# Patient Record
Sex: FEMALE | Race: WHITE | URBAN - METROPOLITAN AREA
[De-identification: names, ages, dates, MRNs, and addresses within clinical notes are randomized per-mention and may not be internally consistent; named-entity substitution may affect disease eponyms.]

---

## 2020-12-21 LAB
HEP B, EXTERNAL RESULT: NEGATIVE
HEPATITIS C ANTIBODY, EXTERNAL RESULT: NON REACTIVE
RPR, EXTERNAL RESULT: NON REACTIVE
RUBELLA TITER, EXTERNAL RESULT: NORMAL

## 2021-06-23 LAB — GBS, EXTERNAL RESULT: NEGATIVE

## 2021-07-27 ENCOUNTER — HOSPITAL ENCOUNTER (INPATIENT)
Age: 41
LOS: 2 days | Discharge: HOME | End: 2021-07-29
Attending: OBSTETRICS & GYNECOLOGY | Admitting: OBSTETRICS & GYNECOLOGY
Payer: COMMERCIAL

## 2021-07-27 LAB
ABO/RH: NORMAL
ABO/RH: NORMAL
AMPHETAMINE SCREEN, URINE: NORMAL
ANTIBODY SCREEN: NORMAL
BARBITURATE SCREEN URINE: NORMAL
BENZODIAZEPINE SCREEN, URINE: NORMAL
BUPRENORPHINE URINE: NORMAL
CANNABINOID SCREEN URINE: NORMAL
COCAINE METABOLITE SCREEN URINE: NORMAL
HCT VFR BLD CALC: 41.8 % (ref 36–48)
HEMOGLOBIN: 14.2 G/DL (ref 12–16)
Lab: NORMAL
MCH RBC QN AUTO: 29.6 PG (ref 26–34)
MCHC RBC AUTO-ENTMCNC: 33.9 G/DL (ref 31–36)
MCV RBC AUTO: 87.1 FL (ref 80–100)
METHADONE SCREEN, URINE: NORMAL
OPIATE SCREEN URINE: NORMAL
OXYCODONE URINE: NORMAL
PDW BLD-RTO: 14.3 % (ref 12.4–15.4)
PH UA: 7
PHENCYCLIDINE SCREEN URINE: NORMAL
PLATELET # BLD: 185 K/UL (ref 135–450)
PMV BLD AUTO: 10.4 FL (ref 5–10.5)
PROPOXYPHENE SCREEN: NORMAL
RBC # BLD: 4.79 M/UL (ref 4–5.2)
SARS-COV-2, NAAT: NOT DETECTED
WBC # BLD: 8.3 K/UL (ref 4–11)

## 2021-07-27 PROCEDURE — 86900 BLOOD TYPING SEROLOGIC ABO: CPT

## 2021-07-27 PROCEDURE — 85027 COMPLETE CBC AUTOMATED: CPT

## 2021-07-27 PROCEDURE — 80307 DRUG TEST PRSMV CHEM ANLYZR: CPT

## 2021-07-27 PROCEDURE — 86780 TREPONEMA PALLIDUM: CPT

## 2021-07-27 PROCEDURE — 36415 COLL VENOUS BLD VENIPUNCTURE: CPT

## 2021-07-27 PROCEDURE — 87635 SARS-COV-2 COVID-19 AMP PRB: CPT

## 2021-07-27 PROCEDURE — 86901 BLOOD TYPING SEROLOGIC RH(D): CPT

## 2021-07-27 PROCEDURE — 86850 RBC ANTIBODY SCREEN: CPT

## 2021-07-27 PROCEDURE — 1220000000 HC SEMI PRIVATE OB R&B

## 2021-07-27 PROCEDURE — 2580000003 HC RX 258: Performed by: OBSTETRICS & GYNECOLOGY

## 2021-07-27 PROCEDURE — 99211 OFF/OP EST MAY X REQ PHY/QHP: CPT

## 2021-07-27 RX ORDER — LIDOCAINE HYDROCHLORIDE 10 MG/ML
30 INJECTION, SOLUTION EPIDURAL; INFILTRATION; INTRACAUDAL; PERINEURAL PRN
Status: DISCONTINUED | OUTPATIENT
Start: 2021-07-27 | End: 2021-07-28

## 2021-07-27 RX ORDER — SODIUM CHLORIDE, SODIUM LACTATE, POTASSIUM CHLORIDE, CALCIUM CHLORIDE 600; 310; 30; 20 MG/100ML; MG/100ML; MG/100ML; MG/100ML
500 INJECTION, SOLUTION INTRAVENOUS PRN
Status: DISCONTINUED | OUTPATIENT
Start: 2021-07-27 | End: 2021-07-28

## 2021-07-27 RX ORDER — PNV NO.95/FERROUS FUM/FOLIC AC 28MG-0.8MG
TABLET ORAL
COMMUNITY

## 2021-07-27 RX ORDER — SODIUM CHLORIDE, SODIUM LACTATE, POTASSIUM CHLORIDE, CALCIUM CHLORIDE 600; 310; 30; 20 MG/100ML; MG/100ML; MG/100ML; MG/100ML
INJECTION, SOLUTION INTRAVENOUS CONTINUOUS
Status: DISCONTINUED | OUTPATIENT
Start: 2021-07-27 | End: 2021-07-28

## 2021-07-27 RX ORDER — TERBUTALINE SULFATE 1 MG/ML
0.25 INJECTION, SOLUTION SUBCUTANEOUS ONCE
Status: DISCONTINUED | OUTPATIENT
Start: 2021-07-27 | End: 2021-07-28

## 2021-07-27 RX ORDER — SODIUM CHLORIDE, SODIUM LACTATE, POTASSIUM CHLORIDE, CALCIUM CHLORIDE 600; 310; 30; 20 MG/100ML; MG/100ML; MG/100ML; MG/100ML
1000 INJECTION, SOLUTION INTRAVENOUS PRN
Status: DISCONTINUED | OUTPATIENT
Start: 2021-07-27 | End: 2021-07-28

## 2021-07-27 RX ORDER — ONDANSETRON 2 MG/ML
4 INJECTION INTRAMUSCULAR; INTRAVENOUS EVERY 6 HOURS PRN
Status: DISCONTINUED | OUTPATIENT
Start: 2021-07-27 | End: 2021-07-28

## 2021-07-27 RX ADMIN — SODIUM CHLORIDE, POTASSIUM CHLORIDE, SODIUM LACTATE AND CALCIUM CHLORIDE: 600; 310; 30; 20 INJECTION, SOLUTION INTRAVENOUS at 22:02

## 2021-07-27 ASSESSMENT — PAIN DESCRIPTION - DESCRIPTORS
DESCRIPTORS: CRAMPING

## 2021-07-28 ENCOUNTER — ANESTHESIA EVENT (OUTPATIENT)
Dept: LABOR AND DELIVERY | Age: 41
End: 2021-07-28
Payer: COMMERCIAL

## 2021-07-28 ENCOUNTER — ANESTHESIA (OUTPATIENT)
Dept: LABOR AND DELIVERY | Age: 41
End: 2021-07-28
Payer: COMMERCIAL

## 2021-07-28 PROBLEM — O34.219 PREVIOUS CESAREAN SECTION COMPLICATING PREGNANCY: Status: ACTIVE | Noted: 2021-07-28

## 2021-07-28 PROBLEM — Z34.90 SUPERVISION OF REPEAT TERM PREGNANCY: Status: ACTIVE | Noted: 2021-07-28

## 2021-07-28 PROBLEM — Z37.9 NORMAL LABOR: Status: ACTIVE | Noted: 2021-07-28

## 2021-07-28 PROBLEM — Z98.891 HISTORY OF VBAC: Status: ACTIVE | Noted: 2021-07-28

## 2021-07-28 LAB — TOTAL SYPHILLIS IGG/IGM: NORMAL

## 2021-07-28 PROCEDURE — 7200000001 HC VAGINAL DELIVERY

## 2021-07-28 PROCEDURE — 10907ZC DRAINAGE OF AMNIOTIC FLUID, THERAPEUTIC FROM PRODUCTS OF CONCEPTION, VIA NATURAL OR ARTIFICIAL OPENING: ICD-10-PCS | Performed by: OBSTETRICS & GYNECOLOGY

## 2021-07-28 PROCEDURE — 1200000000 HC SEMI PRIVATE

## 2021-07-28 PROCEDURE — 2500000003 HC RX 250 WO HCPCS: Performed by: NURSE ANESTHETIST, CERTIFIED REGISTERED

## 2021-07-28 PROCEDURE — 51702 INSERT TEMP BLADDER CATH: CPT

## 2021-07-28 PROCEDURE — 3700000025 EPIDURAL BLOCK: Performed by: ANESTHESIOLOGY

## 2021-07-28 PROCEDURE — 6370000000 HC RX 637 (ALT 250 FOR IP): Performed by: OBSTETRICS & GYNECOLOGY

## 2021-07-28 PROCEDURE — 6360000002 HC RX W HCPCS: Performed by: ANESTHESIOLOGY

## 2021-07-28 PROCEDURE — 6360000002 HC RX W HCPCS: Performed by: OBSTETRICS & GYNECOLOGY

## 2021-07-28 PROCEDURE — 2580000003 HC RX 258: Performed by: OBSTETRICS & GYNECOLOGY

## 2021-07-28 PROCEDURE — 6360000002 HC RX W HCPCS: Performed by: NURSE ANESTHETIST, CERTIFIED REGISTERED

## 2021-07-28 RX ORDER — OXYCODONE HYDROCHLORIDE AND ACETAMINOPHEN 5; 325 MG/1; MG/1
1 TABLET ORAL EVERY 6 HOURS PRN
Status: DISCONTINUED | OUTPATIENT
Start: 2021-07-28 | End: 2021-07-29 | Stop reason: HOSPADM

## 2021-07-28 RX ORDER — SODIUM CHLORIDE 0.9 % (FLUSH) 0.9 %
10 SYRINGE (ML) INJECTION EVERY 12 HOURS SCHEDULED
Status: DISCONTINUED | OUTPATIENT
Start: 2021-07-28 | End: 2021-07-29 | Stop reason: HOSPADM

## 2021-07-28 RX ORDER — MODIFIED LANOLIN
OINTMENT (GRAM) TOPICAL PRN
Status: DISCONTINUED | OUTPATIENT
Start: 2021-07-28 | End: 2021-07-29 | Stop reason: HOSPADM

## 2021-07-28 RX ORDER — IBUPROFEN 600 MG/1
600 TABLET ORAL EVERY 8 HOURS PRN
Status: DISCONTINUED | OUTPATIENT
Start: 2021-07-28 | End: 2021-07-29 | Stop reason: HOSPADM

## 2021-07-28 RX ORDER — FENTANYL CITRATE 50 UG/ML
INJECTION, SOLUTION INTRAMUSCULAR; INTRAVENOUS PRN
Status: DISCONTINUED | OUTPATIENT
Start: 2021-07-28 | End: 2021-07-28 | Stop reason: SDUPTHER

## 2021-07-28 RX ORDER — BUPIVACAINE HYDROCHLORIDE 2.5 MG/ML
INJECTION, SOLUTION EPIDURAL; INFILTRATION; INTRACAUDAL PRN
Status: DISCONTINUED | OUTPATIENT
Start: 2021-07-28 | End: 2021-07-28 | Stop reason: SDUPTHER

## 2021-07-28 RX ORDER — EPHEDRINE SULFATE 50 MG/ML
INJECTION INTRAVENOUS PRN
Status: DISCONTINUED | OUTPATIENT
Start: 2021-07-28 | End: 2021-07-28 | Stop reason: SDUPTHER

## 2021-07-28 RX ORDER — SODIUM CHLORIDE 9 MG/ML
25 INJECTION, SOLUTION INTRAVENOUS PRN
Status: DISCONTINUED | OUTPATIENT
Start: 2021-07-28 | End: 2021-07-29 | Stop reason: HOSPADM

## 2021-07-28 RX ORDER — DOCUSATE SODIUM 100 MG/1
100 CAPSULE, LIQUID FILLED ORAL 2 TIMES DAILY
Status: DISCONTINUED | OUTPATIENT
Start: 2021-07-28 | End: 2021-07-29 | Stop reason: HOSPADM

## 2021-07-28 RX ORDER — SODIUM CHLORIDE 0.9 % (FLUSH) 0.9 %
10 SYRINGE (ML) INJECTION PRN
Status: DISCONTINUED | OUTPATIENT
Start: 2021-07-28 | End: 2021-07-29 | Stop reason: HOSPADM

## 2021-07-28 RX ORDER — ACETAMINOPHEN 325 MG/1
650 TABLET ORAL EVERY 4 HOURS PRN
Status: DISCONTINUED | OUTPATIENT
Start: 2021-07-28 | End: 2021-07-29 | Stop reason: HOSPADM

## 2021-07-28 RX ORDER — LIDOCAINE HYDROCHLORIDE AND EPINEPHRINE 15; 5 MG/ML; UG/ML
INJECTION, SOLUTION EPIDURAL PRN
Status: DISCONTINUED | OUTPATIENT
Start: 2021-07-28 | End: 2021-07-28 | Stop reason: SDUPTHER

## 2021-07-28 RX ORDER — SODIUM CHLORIDE, SODIUM LACTATE, POTASSIUM CHLORIDE, CALCIUM CHLORIDE 600; 310; 30; 20 MG/100ML; MG/100ML; MG/100ML; MG/100ML
INJECTION, SOLUTION INTRAVENOUS CONTINUOUS
Status: DISCONTINUED | OUTPATIENT
Start: 2021-07-28 | End: 2021-07-29 | Stop reason: HOSPADM

## 2021-07-28 RX ORDER — FENTANYL/BUPIVACAINE/NS/PF 2-1250MCG
12 PLASTIC BAG, INJECTION (ML) INJECTION CONTINUOUS
Status: DISCONTINUED | OUTPATIENT
Start: 2021-07-28 | End: 2021-07-28

## 2021-07-28 RX ADMIN — SODIUM CHLORIDE, POTASSIUM CHLORIDE, SODIUM LACTATE AND CALCIUM CHLORIDE: 600; 310; 30; 20 INJECTION, SOLUTION INTRAVENOUS at 01:20

## 2021-07-28 RX ADMIN — IBUPROFEN 600 MG: 600 TABLET ORAL at 12:57

## 2021-07-28 RX ADMIN — SODIUM CHLORIDE, POTASSIUM CHLORIDE, SODIUM LACTATE AND CALCIUM CHLORIDE: 600; 310; 30; 20 INJECTION, SOLUTION INTRAVENOUS at 00:25

## 2021-07-28 RX ADMIN — Medication 87.3 MILLI-UNITS/MIN: at 10:51

## 2021-07-28 RX ADMIN — IBUPROFEN 600 MG: 600 TABLET ORAL at 22:13

## 2021-07-28 RX ADMIN — Medication 1 MILLI-UNITS/MIN: at 06:26

## 2021-07-28 RX ADMIN — EPHEDRINE SULFATE 255 MG: 50 INJECTION, SOLUTION INTRAVENOUS at 00:43

## 2021-07-28 RX ADMIN — Medication 87.3 MILLI-UNITS/MIN: at 14:19

## 2021-07-28 RX ADMIN — BUPIVACAINE HYDROCHLORIDE 4 MG: 2.5 INJECTION, SOLUTION EPIDURAL; INFILTRATION; INTRACAUDAL; PERINEURAL at 00:49

## 2021-07-28 RX ADMIN — Medication 12 ML/HR: at 00:42

## 2021-07-28 RX ADMIN — EPHEDRINE SULFATE 15 MG: 50 INJECTION, SOLUTION INTRAVENOUS at 00:48

## 2021-07-28 RX ADMIN — OXYCODONE HYDROCHLORIDE AND ACETAMINOPHEN 1 TABLET: 5; 325 TABLET ORAL at 13:50

## 2021-07-28 RX ADMIN — DOCUSATE SODIUM 100 MG: 100 CAPSULE ORAL at 22:13

## 2021-07-28 RX ADMIN — LIDOCAINE HYDROCHLORIDE AND EPINEPHRINE 2 ML: 15; 5 INJECTION, SOLUTION EPIDURAL at 00:42

## 2021-07-28 RX ADMIN — EPHEDRINE SULFATE 100 MG: 50 INJECTION, SOLUTION INTRAVENOUS at 00:53

## 2021-07-28 RX ADMIN — ACETAMINOPHEN 650 MG: 325 TABLET ORAL at 18:24

## 2021-07-28 RX ADMIN — BUPIVACAINE HYDROCHLORIDE 5 MG: 2.5 INJECTION, SOLUTION EPIDURAL; INFILTRATION; INTRACAUDAL; PERINEURAL at 03:13

## 2021-07-28 RX ADMIN — FENTANYL CITRATE 100 MCG: 50 INJECTION, SOLUTION INTRAMUSCULAR; INTRAVENOUS at 03:13

## 2021-07-28 RX ADMIN — LIDOCAINE HYDROCHLORIDE AND EPINEPHRINE 3 ML: 15; 5 INJECTION, SOLUTION EPIDURAL at 00:39

## 2021-07-28 RX ADMIN — SODIUM CHLORIDE, POTASSIUM CHLORIDE, SODIUM LACTATE AND CALCIUM CHLORIDE: 600; 310; 30; 20 INJECTION, SOLUTION INTRAVENOUS at 05:32

## 2021-07-28 ASSESSMENT — PAIN DESCRIPTION - DESCRIPTORS: DESCRIPTORS: CRAMPING

## 2021-07-28 ASSESSMENT — PAIN SCALES - GENERAL
PAINLEVEL_OUTOF10: 7
PAINLEVEL_OUTOF10: 7
PAINLEVEL_OUTOF10: 3
PAINLEVEL_OUTOF10: 5

## 2021-07-28 NOTE — LACTATION NOTE
This note was copied from a baby's chart. Lactation Progress Note      Data:  LC to bedside. MOB had infant latched to the right breast.     Action:  Infant appeared to have a deep latch and a few audible swallows. MOB reported no pain or discomfort felt during the feeding. MOB worried infant is not getting enough. Infant appears to be latching deep. Colostrum easily expressed. Discussed pump options for when MOB needs to start giving a bottle, when to introduce a bottle, and pumping and returning to work. Lanolin provided. Response:  No other questions at this time.

## 2021-07-28 NOTE — FLOWSHEET NOTE
Dr. Rey Cheung at bedside to talk with patient about  delivery and possible augmentation of labor. Will admit patient and do labs and saline lock and see how patient progresses in labor.

## 2021-07-28 NOTE — H&P
Obstetrics and Gynecology   Obstetrics History and Physical        CHIEF COMPLAINT:  contractions, and noted cervical change by me at admission earlier last evening    HISTORY OF PRESENT ILLNESS:      The patient is a 37 y.o. female at 40w1d. OB History        3    Para   2    Term   2       0    AB   0    Living   2       SAB   0    TAB   0    Ectopic   0    Molar   0    Multiple   0    Live Births   2            Patient presents with a chief complaint as above and is being admitted for active phase labor. The patient does not express a strong desire for natural childbirth and she is consented for this choice at each office visit. Estimated Due Date: Estimated Date of Delivery: 21    PRENATAL CARE:    Complicated by: see below  Patient Active Problem List:     Previous  section complicating pregnancy     History of      Normal labor     Supervision of repeat term pregnancy        PAST OB HISTORY:  OB History        3    Para   2    Term   2       0    AB   0    Living   2       SAB   0    TAB   0    Ectopic   0    Molar   0    Multiple   0    Live Births   2                Past Medical History:    History reviewed. No pertinent past medical history. Past Surgical History:        Procedure Laterality Date     SECTION      TONSILLECTOMY       Allergies:  Patient has no known allergies.     Social History:    Social History     Socioeconomic History    Marital status:      Spouse name: Not on file    Number of children: Not on file    Years of education: Not on file    Highest education level: Not on file   Occupational History    Not on file   Tobacco Use    Smoking status: Never Smoker    Smokeless tobacco: Never Used   Vaping Use    Vaping Use: Never used   Substance and Sexual Activity    Alcohol use: Not Currently    Drug use: Never    Sexual activity: Yes     Partners: Male   Other Topics Concern    Not on file   Social History Narrative    Not on file     Social Determinants of Health     Financial Resource Strain:     Difficulty of Paying Living Expenses:    Food Insecurity:     Worried About Running Out of Food in the Last Year:     [address] in the Last Year:    Transportation Needs:     Lack of Transportation (Medical):  Lack of Transportation (Non-Medical):    Physical Activity:     Days of Exercise per Week:     Minutes of Exercise per Session:    Stress:     Feeling of Stress :    Social Connections:     Frequency of Communication with Friends and Family:     Frequency of Social Gatherings with Friends and Family:     Attends [employer] Services:     Active Member of Clubs or Organizations:     Attends Club or Organization Meetings:     Marital Status:    Intimate Partner Violence:     Fear of Current or Ex-Partner:     Emotionally Abused:     Physically Abused:     Sexually Abused:      Family History:       Problem Relation Age of Onset    Heart Disease Father     Heart Disease Paternal Grandfather      Medications Prior to Admission:  Medications Prior to Admission: Prenatal Vit-Fe Fumarate-FA (PRENATAL VITAMINS) 28-0.8 MG TABS, Take by mouth    REVIEW OF SYSTEMS:  Denies any of the following:  fever/chills, headache, visual changes, chest pain, shortness of breath, nausea/vomiting/diarrhea, bruising and rash    PHYSICAL EXAM:  Vitals:    07/28/21 0323 07/28/21 0353 07/28/21 0423 07/28/21 0456   BP: (!) 103/51 (!) 103/58 101/61 (!) 69/31   Pulse: 113 142 120 169   Resp: 18 16 16    Temp:   97.6 °F (36.4 °C)    SpO2: 96% 98% 97% 98%     General appearance:  awake, alert, cooperative, no apparent distress, and appears stated age  Neurologic:  Awake, alert, oriented to name, place and time.     Lungs:  No increased work of breathing, good air exchange  Abdomen:  Soft, non tender, gravid, consistent with her gestational age,   EFW:  by external exam was  appropriate for gestational age  Fetal heart rate: Reassuring. FETAL SURVEILLANCE TESTING SUMMARY  INDICATIONS:  Early labor evaluation. OBJECTIVE RESULTS:  Fetal heart variability: moderate  Fetal Heart Rate decelerations: none  Fetal Heart Rate accelerations: yes  Baseline FHR: 135 per minute  Uterine contractions: regular, every 3-5 minutes  Fetal surveillance: reassuring, Category 1  Pelvis:  Adequate pelvis  Fetal position: Cephalic    CERVIX: At admission in triage:    Dilation:  3 cm  Effacement:   50%  Station:  -2 cm  Consistency:  medium  Position:  mid    Dilation (cm): 6 (6-7)   Effacement (%): 80   Cervical Characteristics: Mid-Position   Station: 0   Presentation: Vertex    Membranes:  Intact    Labs:   CBC:   Lab Results   Component Value Date    WBC 8.3 07/27/2021    RBC 4.79 07/27/2021    HGB 14.2 07/27/2021    HCT 41.8 07/27/2021    MCV 87.1 07/27/2021    MCH 29.6 07/27/2021    MCHC 33.9 07/27/2021    RDW 14.3 07/27/2021     07/27/2021    MPV 10.4 07/27/2021       ASSESSMENT AND PLAN:    Labor: Admit, anticipate normal delivery, routine labor orders  Fetus: Reassuring  GBS: No  Other: Supportive care for this patient at term with desire for natural childbirth and with mom and baby doing well at this time. This presentation is noted by the patient to be different with the last delivery but she is at one week past her due date and expresses anxiety with the decision making process at this time. We have a thorough discussion regarding all options that include the risks of waiting for further signs of labor progress versus immediate admission and labor support in a more active management nature and she asks appropriate questions that are all answered to her satisfaction and she agrees that admission and labor support at this time is the most prudent course. She declines ROM until the labor process has had a chance to progress and/or she has comfort with her planned epidural anesthesia.

## 2021-07-28 NOTE — PROGRESS NOTES
RN remained at bedside. Dr Katherine Sandifer came to bedside to push with pt per pt request. Both doctor and nurse remained at bedside until after delivery.

## 2021-07-28 NOTE — FLOWSHEET NOTE
Unable to obtain bp  between 9147-1911 d/t pt  Moving around in bed and bending arm. Pt c/o feeling lightheaded, dizzy and nauseated. Anesthesia notified.

## 2021-07-28 NOTE — H&P
Labor and Delivery Progress Note    Contraction pain is none. Epidural Yes, and re-dosed now to where the patient is representing some discomfort with feeling too numb. VS:   Patient Vitals for the past 8 hrs:   BP Temp Pulse Resp SpO2   21 0456 (!) 69/31 -- 169 -- 98 %   21 0423 101/61 97.6 °F (36.4 °C) 120 16 97 %   21 0353 (!) 103/58 -- 142 16 98 %   21 0323 (!) 103/51 -- 113 18 96 %   21 0254 (!) 100/50 -- 109 18 94 %   21 0223 (!) 96/52 97.8 °F (36.6 °C) 113 18 98 %   21 0154 (!) 108/58 -- 100 -- 98 %   21 0122 (!) 106/59 -- -- -- 98 %   21 0113 (!) 115/55 -- 88 -- 99 %   21 0107 (!) 98/57 -- 150 -- 97 %   21 0104 (!) 92/37 -- 162 -- 100 %   21 0100 (!) 98/50 -- 80 -- --   21 0058 (!) 87/49 -- 136 -- --   21 0046 (!) 111/54 -- 95 -- 100 %   21 0042 (!) 118/53 -- 97 -- 98 %   21 2354 (!) 138/90 -- 107 -- --   21 2145 -- -- 101 -- --   21 2130 104/66 -- 100 18 --         FHT: Baseline: 145   Variability: moderate   Accelerations: Present   Decels: Absent    Cervical Exam:   Dilation (cm): 6 (6-7)   Effacement (%): 80   Cervical Characteristics: Mid-Position   Station: 0   Presentation: Vertex    A/P:   · Labor progress is slow but reassuring      · Fetus: reassuring, Category 1   · Pitocin augmentation: Offered to patient and option for ROM is now preferred after waiting several hours per patient request for this as this was associated with a possible need for  section as with the first delivery. Will continue to hold off on this for now. Long discussion is again given for the use of ROM and she does ask questions that are all answered to her satisfaction and she agrees to this intervention. · AROM performed with minimal return of clear fluid and with significant cervical and fetal station changes at this time.

## 2021-07-28 NOTE — PROGRESS NOTES
Okay to start pushing per Dr Bebo Hodgson. RN will remain at bedside and continue to monitor EFM and toco until delivery. RN will continue to palpate pt abdomen as remains at bedside.

## 2021-07-28 NOTE — ANESTHESIA PRE PROCEDURE
Department of Anesthesiology  Preprocedure Note       Name:  Eden Mai   Age:  37 y.o.  :  1980                                          MRN:  [id number]         Date:  2021      Surgeon: * No surgeons listed *    Procedure: * No procedures listed *    Medications prior to admission:   Prior to Admission medications    Medication Sig Start Date End Date Taking?  Authorizing Provider   Prenatal Vit-Fe Fumarate-FA (PRENATAL VITAMINS) 28-0.8 MG TABS Take by mouth   Yes Historical Provider, MD       Current medications:    Current Facility-Administered Medications   Medication Dose Route Frequency Provider Last Rate Last Admin    fentaNYL 2mcg/mL bupivacaine 0.125% in sodium chloride 0.9% 250mL (OB) epidural  12 mL/hr Epidural Continuous Tawanda Juárez MD 1,122 mL/hr at 21 0042 1,122 mL/hr at 21 0042    lactated ringers infusion   Intravenous Continuous Gentry Nayak  mL/hr at 21 0025 New Bag at 21 0025    lactated ringers infusion 500 mL  500 mL Intravenous PRN Gentry Nayak MD        Or   [facility] lactated ringers infusion 1,000 mL  1,000 mL Intravenous PRN Gentry Nayak MD        oxytocin (PITOCIN) 10 unit bolus from the bag  10 Units Intravenous PRN Gentry Nayak MD        And    oxytocin (PITOCIN) 30 units in 500 mL infusion  87.3 alma-units/min Intravenous Continuous PRN Gentry Nayak MD        ondansetron [facility]) injection 4 mg  4 mg Intravenous Q6H PRN Gentry Nayak MD        lidocaine PF 1 % injection 30 mL  30 mL Other PRN Gentry Nayak MD        terbutaline (BRETHINE) injection 0.25 mg  0.25 mg Subcutaneous Once Gentry Nayak MD         Facility-Administered Medications Ordered in Other Encounters   Medication Dose Route Frequency Provider Last Rate Last Admin    bupivacaine (PF) (MARCAINE) 0.25 % injection   Epidural PRN URIEL Helms - CRNA   4 mg at 21 0049    Lidocaine-EPINEPHrine 1.5 %-1:223800   Epidural PRN Ira Leacherin, APRN - CRNA   2 mL at 21 0042    ePHEDrine injection   Intravenous PRN Ira Yanna, APRN - CRNA   100 mg at 21 0053       Allergies:  No Known Allergies    Problem List:  There is no problem list on file for this patient. Past Medical History:  History reviewed. No pertinent past medical history. Past Surgical History:        Procedure Laterality Date     SECTION      TONSILLECTOMY         Social History:    Social History     Tobacco Use    Smoking status: Never Smoker    Smokeless tobacco: Never Used   Substance Use Topics    Alcohol use: Not Currently                                Counseling given: Not Answered      Vital Signs (Current):   Vitals:    21 2145 21 2354 21 0042 21 0046   BP:  (!) 138/90 (!) 118/53 (!) 111/54   Pulse: 101 107 97 95   Resp:       Temp:       SpO2:   98% 100%                                              BP Readings from Last 3 Encounters:   21 (!) 111/54       NPO Status: Time of last liquid consumption: 1700                        Time of last solid consumption: 1700                        Date of last liquid consumption: 21                        Date of last solid food consumption: 21    BMI:   Wt Readings from Last 3 Encounters:   No data found for Wt     There is no height or weight on file to calculate BMI.    CBC:   Lab Results   Component Value Date    WBC 8.3 2021    RBC 4.79 2021    HGB 14.2 2021    HCT 41.8 2021    MCV 87.1 2021    RDW 14.3 2021     2021       CMP: No results found for: NA, K, CL, CO2, BUN, CREATININE, GFRAA, AGRATIO, LABGLOM, GLUCOSE, PROT, CALCIUM, BILITOT, ALKPHOS, AST, ALT    POC Tests: No results for input(s): POCGLU, POCNA, POCK, POCCL, POCBUN, POCHEMO, POCHCT in the last 72 hours.     Coags: No results found for: PROTIME, INR, APTT    HCG (If Applicable): No results found for: PREGTESTUR, PREGSERUM, HCG,

## 2021-07-28 NOTE — PROGRESS NOTES
Assisted pt to bathroom with assist x2. Pt c/o left leg numbness. Pt with unsteady gait. Instructed pt to call for assistance before getting out of bed on post partum. Pt able to void moderate amount in toilet. Esperanza care explained to pt who demonstrated positive understanding. Pt transferred to post partum unit via wheelchair with infant in arms and  following with all pt belongings.

## 2021-07-28 NOTE — ANESTHESIA PROCEDURE NOTES
Epidural Block    Patient location during procedure: OB  Start time: 2021 12:29 AM  End time: 2021 12:39 AM  Reason for block: labor epidural  Staffing  Performed: anesthesiologist and resident/CRNA   Anesthesiologist: Heriberto Trejo MD  Resident/CRNA: Amira Baeza APRN - CRNA  Preanesthetic Checklist  Completed: patient identified, IV checked, site marked, risks and benefits discussed, surgical consent, monitors and equipment checked, pre-op evaluation, timeout performed, anesthesia consent given, oxygen available and patient being monitored  Epidural  Patient position: sitting  Prep: ChloraPrep  Patient monitoring: cardiac monitor, continuous pulse ox and frequent blood pressure checks  Approach: midline  Location: lumbar (1-5)  Provider prep: mask and sterile gloves  Needle  Needle type: Tuohy   Needle gauge: 17 G  Needle length: 3.5 in  Needle insertion depth: 5 cm  Catheter type: side hole  Catheter size: 19 G (20 G)  Catheter at skin depth: 10 cm  Test dose: negative  Assessment  Sensory level: T10  Hemodynamics: stable  Attempts: 1  Additional Notes  Called for labor epidural analgesia request. Medical and Surgical history reviewed with pt. Risks/benefits of epidural discussed including allergic reaction, infection, bleeding, hypotension, headache, back pain, nerve damage, failed or one-sided block. Also discussed anesthesia options and associated risks in the event of . Questions answered. Verbalizes understanding and requests to proceed. Pt in sitting position. Labor epidural placed using DAX sterile technique (donned mask and sterile gloves). Back prepped with [email or web address]. Sterile drape applied. Site: L3-4  DAX:   5 cm. Attempts:  1   Re-directs:  0  Site infiltrated with 1%Lidocaine. 17G Tuohy needle inserted, DAX technique with saline. No heme, CSF, or paresthesias noted. Epidural space dilated with saline.  #25ga pencan needle placed through tuohy for dural puncture. +CSF -heme or paresthesia. Spinal needle removed. Threaded epidural catheter through Tuohy needle easily. Tuohy needle withdrawn. Test Dose:           Negative aspiration. 3cc of 1.5% Lido with epi 1:200,000 test dose given. Negative test dose. Skin:  Xcm catheter taped at the skin. Secured with steri strips, tegaderm, and tape. Infusion:  .15% Ropivacaine with Fentanyl (2ug/cc)  Auto bolus 4 ml every 20 minutes. (Max. Dose- 40 ml/hr.)      Sensory Level:  R:  T10 L:  t10    VAS: start 8/10, end 0/10    Patient in supine position with left uterine displacement.  VSS:

## 2021-07-28 NOTE — L&D DELIVERY NOTE
Mother's Information    Labor Events    Rupture type: Artificial=AROM, Intact  Fluid color: Bloody Show  Fluid odor: None     Mother Delivery Information    Surgical or Additional Est. Blood Loss (mL): 0 (View Only): Edit in Flowsheets   Combined Est. Blood Loss (mL): 0        Palm, Baby Pending Theresa [[id number]]    Labor Events    Cervical ripening date/time:     Rupture date/time:     Rupture type: Artificial=AROM, Intact  Fluid color: Bloody Show  Fluid odor: None          Labor Event Times    Labor onset date/time:     Dilation complete date/time:  21 0901 EDT   Start pushing:    Decision time (emergent ):       Delivery Providers    Delivering clinician:      [place] Measurements       Delivery Information    Surgical or additional est. blood loss (mL): 0 (View Only): Edit in Flowsheets   Combined est. blood loss (mL): 0          Spontaneous vaginal delivery over intact posterior fourchette with minimal superficial abrasions of the vaginal mucosa only with mom and baby doing well at this time.

## 2021-07-28 NOTE — FLOWSHEET NOTE
Presented to Labor and delivery a  at 41 weeks gestation here with c/o contractions all day and states she was 3cm and 20% effaced in office today and has been anthony ever since. Vs taken and WNL. V/E done and 4cm 40% effaced and -2 station [facility], [place]. Dr. Leatha Noriega notified of patient arrival and here in department and will be over to talk with patient.

## 2021-07-29 VITALS
HEART RATE: 92 BPM | TEMPERATURE: 97.1 F | DIASTOLIC BLOOD PRESSURE: 50 MMHG | OXYGEN SATURATION: 100 % | RESPIRATION RATE: 16 BRPM | SYSTOLIC BLOOD PRESSURE: 86 MMHG

## 2021-07-29 PROBLEM — O34.219 PREVIOUS CESAREAN SECTION COMPLICATING PREGNANCY: Status: RESOLVED | Noted: 2021-07-28 | Resolved: 2021-07-29

## 2021-07-29 PROBLEM — Z37.9 NORMAL LABOR: Status: RESOLVED | Noted: 2021-07-28 | Resolved: 2021-07-29

## 2021-07-29 PROBLEM — Z34.90 SUPERVISION OF REPEAT TERM PREGNANCY: Status: RESOLVED | Noted: 2021-07-28 | Resolved: 2021-07-29

## 2021-07-29 PROBLEM — Z98.891 HISTORY OF VBAC: Status: RESOLVED | Noted: 2021-07-28 | Resolved: 2021-07-29

## 2021-07-29 PROCEDURE — 6370000000 HC RX 637 (ALT 250 FOR IP): Performed by: OBSTETRICS & GYNECOLOGY

## 2021-07-29 RX ADMIN — BENZOCAINE AND LEVOMENTHOL: 200; 5 SPRAY TOPICAL at 08:48

## 2021-07-29 RX ADMIN — IBUPROFEN 600 MG: 600 TABLET ORAL at 08:25

## 2021-07-29 RX ADMIN — ACETAMINOPHEN 650 MG: 325 TABLET ORAL at 12:58

## 2021-07-29 RX ADMIN — OXYCODONE HYDROCHLORIDE AND ACETAMINOPHEN 1 TABLET: 5; 325 TABLET ORAL at 02:57

## 2021-07-29 RX ADMIN — DOCUSATE SODIUM 100 MG: 100 CAPSULE ORAL at 08:25

## 2021-07-29 ASSESSMENT — PAIN SCALES - GENERAL
PAINLEVEL_OUTOF10: 7

## 2021-07-29 NOTE — DISCHARGE SUMMARY
Obstetrical Discharge Form    Gestational Age: 40w1d    Antepartum complications: none    Date of Delivery:    Information for the patient's :  Lazara Mercado [[id number]]   @[facility]@      Information for the patient's :  Lazara Mercado [[id number]]   @[facility]       Type of Delivery: vaginal, spontaneous    Delivered By: Yen Foster MD    Baby:      Information for the patient's :  Lazara Mercado [[id number]]   APGAR One: 8     Information for the patient's :  Lazara Mercado [[id number]]   APGAR Five: 9     Information for the patient's :  Lazara Mercado [[id number]]   Birth Weight: 7 lb 14.8 oz (3.595 kg)       Anesthesia: Epidural    Intrapartum complications: None    Postpartum complications: none    Discharge Medication:    Palm, [address] Medication Instructions ROSALIE:[id number]    Printed on:21 1305   Medication Information                      Prenatal Vit-Fe Fumarate-FA (PRENATAL VITAMINS) 28-0.8 MG TABS  Take by mouth                  Discharge Condition:  good    Discharge Date: 2021    PLAN:  Follow up in 6 weeks for routine PP visit  All questions answered  D/C summary is written for D/C planning purposes, any delay in discharge from ordered D/C date due may be due to  factors and may be dependant on pediatric orders for  discharge planning.     Yen Foster MD

## 2021-07-29 NOTE — ANESTHESIA POSTPROCEDURE EVALUATION
Department of Anesthesiology  Postprocedure Note    Patient: Vanessa Ordoñez  MRN: [id number]  YOB: 1980  Date of evaluation: 7/29/2021  Time:  3:13 PM     Procedure Summary     Date: 07/28/21 Room / Location:     Anesthesia Start: 0029 Anesthesia Stop: 2396    Procedure: Labor Analgesia Diagnosis:     Scheduled Providers:  Responsible Provider: Ji Ma MD    Anesthesia Type: epidural ASA Status: 2          Anesthesia Type: epidural    Chandan Phase I:      Chandan Phase II:      Last vitals: Reviewed and per EMR flowsheets. Anesthesia Post Evaluation    Patient location during evaluation: floor  Patient participation: complete - patient participated  Level of consciousness: awake and alert  Pain score: 0  Airway patency: patent  Nausea & Vomiting: no nausea and no vomiting  Complications: no  Cardiovascular status: hemodynamically stable  Respiratory status: room air  Hydration status: stable    Patient s/p epidural for L&D. Pt denies residual numbness post block. Patient is ambulating and voiding without difficulty. Patient denies back pain, headache, paresthesias, n/v or pruritus. Epidural site is free of signs of infection.

## 2021-07-29 NOTE — FLOWSHEET NOTE
Postpartum and infant care teaching completed and forms signed by patient. Copy witnessed by RN and given to patient. Patient verbalized understanding of all teaching points. Prescriptions given if applicable. Patient plans to follow-[facility] with [facility] as instructed. Patient verbalizes understanding of discharge instructions and denies further questions. ID bands checked. Mother's ID band and one of baby's ID bands removed and taped to footprint sheet, signed by patient and witnessed by RN. Patient discharged in stable condition accompanied by family/guardian. Discharged in wheelchair, holding baby in arms.  07/29/21 at 1340

## 2024-08-12 ENCOUNTER — APPOINTMENT (OUTPATIENT)
Dept: LAB | Age: 44
End: 2024-08-12